# Patient Record
Sex: FEMALE | Race: WHITE
[De-identification: names, ages, dates, MRNs, and addresses within clinical notes are randomized per-mention and may not be internally consistent; named-entity substitution may affect disease eponyms.]

---

## 2018-06-15 ENCOUNTER — HOSPITAL ENCOUNTER (EMERGENCY)
Dept: HOSPITAL 62 - ER | Age: 73
Discharge: HOME | End: 2018-06-15
Payer: MEDICARE

## 2018-06-15 VITALS — DIASTOLIC BLOOD PRESSURE: 78 MMHG | SYSTOLIC BLOOD PRESSURE: 173 MMHG
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
## 2018-06-15 DIAGNOSIS — F17.200: ICD-10-CM

## 2018-06-15 DIAGNOSIS — L03.119: Primary | ICD-10-CM

## 2018-06-15 DIAGNOSIS — R21: ICD-10-CM

## 2018-06-15 LAB
ADD MANUAL DIFF: NO
ANION GAP SERPL CALC-SCNC: 10 MMOL/L (ref 5–19)
BASOPHILS # BLD AUTO: 0 10^3/UL (ref 0–0.2)
BASOPHILS NFR BLD AUTO: 0.6 % (ref 0–2)
BUN SERPL-MCNC: 34 MG/DL (ref 7–20)
CALCIUM: 10 MG/DL (ref 8.4–10.2)
CHLORIDE SERPL-SCNC: 107 MMOL/L (ref 98–107)
CO2 SERPL-SCNC: 28 MMOL/L (ref 22–30)
EOSINOPHIL # BLD AUTO: 0.5 10^3/UL (ref 0–0.6)
EOSINOPHIL NFR BLD AUTO: 7.6 % (ref 0–6)
ERYTHROCYTE [DISTWIDTH] IN BLOOD BY AUTOMATED COUNT: 13.7 % (ref 11.5–14)
GLUCOSE SERPL-MCNC: 90 MG/DL (ref 75–110)
HCT VFR BLD CALC: 43.1 % (ref 36–47)
HGB BLD-MCNC: 14.6 G/DL (ref 12–15.5)
LYMPHOCYTES # BLD AUTO: 2.1 10^3/UL (ref 0.5–4.7)
LYMPHOCYTES NFR BLD AUTO: 31.6 % (ref 13–45)
MCH RBC QN AUTO: 31.9 PG (ref 27–33.4)
MCHC RBC AUTO-ENTMCNC: 33.9 G/DL (ref 32–36)
MCV RBC AUTO: 94 FL (ref 80–97)
MONOCYTES # BLD AUTO: 0.4 10^3/UL (ref 0.1–1.4)
MONOCYTES NFR BLD AUTO: 6.6 % (ref 3–13)
NEUTROPHILS # BLD AUTO: 3.6 10^3/UL (ref 1.7–8.2)
NEUTS SEG NFR BLD AUTO: 53.6 % (ref 42–78)
PLATELET # BLD: 138 10^3/UL (ref 150–450)
POTASSIUM SERPL-SCNC: 3.8 MMOL/L (ref 3.6–5)
RBC # BLD AUTO: 4.58 10^6/UL (ref 3.72–5.28)
SODIUM SERPL-SCNC: 144.9 MMOL/L (ref 137–145)
TOTAL CELLS COUNTED % (AUTO): 100 %
WBC # BLD AUTO: 6.7 10^3/UL (ref 4–10.5)

## 2018-06-15 PROCEDURE — 36415 COLL VENOUS BLD VENIPUNCTURE: CPT

## 2018-06-15 PROCEDURE — 87040 BLOOD CULTURE FOR BACTERIA: CPT

## 2018-06-15 PROCEDURE — 80048 BASIC METABOLIC PNL TOTAL CA: CPT

## 2018-06-15 PROCEDURE — 99283 EMERGENCY DEPT VISIT LOW MDM: CPT

## 2018-06-15 PROCEDURE — 96365 THER/PROPH/DIAG IV INF INIT: CPT

## 2018-06-15 PROCEDURE — 85025 COMPLETE CBC W/AUTO DIFF WBC: CPT

## 2018-06-15 NOTE — ER DOCUMENT REPORT
ED Skin Rash/Insect Bite/Abscs





- General


Chief Complaint: Insect Bite


Stated Complaint: POSSIBLE INSECT BITE


Time Seen by Provider: 06/15/18 16:09


Notes: 


Patient is a 73-year-old female who presents with diffuse erythematous rash on 

bilateral lower extremities and her left elbow.  She went to urgent care 

yesterday and was started on Keflex and Bactrim after she was bitten by 

multiple spiders that she saw and killed.  The rash is pruritic and she has 

taken Benadryl twice to help with the itchiness.  Patient denies fevers, 

numbness, tingling, difficulty breathing or sores in her mouth.


TRAVEL OUTSIDE OF THE [place] IN LAST 30 DAYS: No





- Related Data


Allergies/Adverse Reactions: 


 





Penicillins Allergy (Verified 06/15/18 15:02)


 


Quinolones Allergy (Verified 06/15/18 15:02)


 











Past Medical History





- General


Information source: Patient





- Social History


Smoking Status: Current Every Day Smoker


Frequency of alcohol use: None


Drug Abuse: None


Family History: Reviewed & Not Pertinent


Patient has suicidal ideation: No


Patient has homicidal ideation: No


Renal/ Medical History: Denies: Hx Peritoneal Dialysis





Review of Systems





- Review of Systems


Notes: 


REVIEW OF SYSTEMS:


CONSTITUTIONAL: -fevers, -chills


EENT: -eye pain, -difficulty swallowing, -nasal congestion


CARDIOVASCULAR: -chest pain, -syncope.


RESPIRATORY: -cough, -SOB


GASTROINTESTINAL: -abdominal pain, -nausea, -vomiting, -diarrhea


GENITOURINARY: -dysuria, -hematuria


MUSCULOSKELETAL: -back pain, -neck pain


SKIN: +rash


HEMATOLOGIC: -easy bruising or bleeding.


LYMPHATIC: -swollen, enlarged glands.


NEUROLOGICAL: -altered mental status or loss of consciousness, -headache, -

neurologic symptoms


PSYCHIATRIC: -anxiety, -depression.


ALL OTHER SYSTEMS REVIEWED AND NEGATIVE.





Physical Exam





- Vital signs


Vitals: 


 











Temp Pulse Resp BP Pulse Ox


 


 97.8 F   65   18   102/68   90 L


 


 06/15/18 15:06  06/15/18 15:06  06/15/18 15:06  06/15/18 15:06  06/15/18 15:06














- Notes


Notes: 


PHYSICAL EXAMINATION:


GENERAL: Well-appearing, well-nourished and in no acute distress.


HEAD: Atraumatic, normocephalic.


EYES: Pupils equal round and reactive to light, extraocular movements intact, 

sclera anicteric, conjunctiva are normal.


ENT: nares patent, oropharynx clear without exudates.  Moist mucous membranes.


NECK: Normal range of motion, supple without lymphadenopathy


LUNGS: Breath sounds clear to auscultation bilaterally and equal.  No wheezes 

rales or rhonchi.


HEART: Regular rate and rhythm without murmurs


ABDOMEN: Soft, nontender, normoactive bowel sounds.  No guarding, no rebound.  

No masses appreciated.


EXTREMITIES: Normal range of motion, no pitting or edema.  No cyanosis.


NEUROLOGICAL: Cranial nerves grossly intact.  Normal speech, normal gait.  

Normal sensory and motor exams.


PSYCH: Normal mood, normal affect.


SKIN: Multiple discrete erythematous areas over right lower extremity, small 

blister over right medial ankle.





Course





- Re-evaluation


Re-evalutation: 


Pt with redness of her right lower leg after multiple spider bites that she 

saw.  Some do appear to be worsening, despite Bactrim and Keflex.  Will switch 

her to clindamycin.  The wounds were demarcated and she understands strict 

return precautions.  She is afebrile and has a normal WBC.  No signs of 

necrotizing fasciitis or abscess at this time.  Given very strict return 

precautions and she understands.





- Vital Signs


Vital signs: 


 











Temp Pulse Resp BP Pulse Ox


 


 97.9 F   68   16   173/78 H  92 


 


 06/15/18 20:09  06/15/18 20:09  06/15/18 20:09  06/15/18 20:09  06/15/18 20:09














- Laboratory


Result Diagrams: 


 06/15/18 16:45





 06/15/18 16:45


Laboratory results interpreted by me: 


 











  06/15/18 06/15/18





  16:45 16:45


 


Plt Count   138 L


 


Eosinophils %   7.6 H


 


BUN  34 H 














Discharge





- Discharge


Clinical Impression: 


 Rash





Cellulitis


Qualifiers:


 Site of cellulitis: extremity Site of cellulitis of extremity: lower extremity 

Laterality: unspecified laterality Qualified Code(s): L03.119 - Cellulitis of 

unspecified part of limb





Insect bite


Qualifiers:


 Encounter type: initial encounter Qualified Code(s): W57.XXXA - Bitten or 

stung by nonvenomous insect and other nonvenomous arthropods, initial encounter





Condition: Stable


Disposition: HOME, SELF-CARE


Additional Instructions: 


CELLULITIS:





     You have an infection of your skin and underlying soft tissues called 

cellulitis.  This is due to bacteria, which can enter through any break in the 

skin, or even through an irritated hair follicle.  Untreated, cellulitis will 

usually worsen.


     Antibiotics are required.  Usually, warm packs or warm soaks, and 

elevation of the infected area are recommended.  You should start getting 

better within 24 to 36 hours.


     Most infections respond quickly to the right medication. Follow-up care is 

important, however, to check for abscess (boil) formation, unsuspected foreign 

body, or resistant infection.


     If you develop fever, chills, or if the area of infection is becoming 

rapidly more swollen or painful, call the doctor at once.





ANTIBIOTIC THERAPY:


     You have been given an antibiotic prescription.  It's important that you 

take all the medication, unless instructed otherwise by your physician.  

Failure to complete the entire course can result in relapse of your condition.


     Common side effects of antibiotics include nausea, intestinal cramping, or 

diarrhea.  Women may develop vaginal yeast infections, and babies can get yeast 

(thrush) in the mouth following the use of antibiotics.  Contact your physician 

if you develop significant side effects from this medication.


     Allergy to this antibiotic can result in hives, wheezing, faintness, or 

itching.  If symptoms of allergy occur, stop the medication and call the doctor.





CLINDAMYCIN:


     You have been given a prescription for the antibiotic clindamycin.  It is 

often prescribed for infections in the mouth, such as dental infections or 

abscesses, and for skin infections due to MRSA.  It's important that you take 

all the medication, unless instructed otherwise by your physician.  Failure to 

complete the entire course can result in relapse of your condition.


     Common side effects of antibiotics include nausea, intestinal cramping, or 

diarrhea.  Women may develop vaginal yeast infections, and babies can get yeast 

(thrush) in the mouth following the use of antibiotics.  Contact your physician 

if you develop significant side effects from this medication.


     Allergy to this antibiotic can result in hives, wheezing, faintness, or 

itching.  If symptoms of allergy occur, stop the medication and call the doctor.





FOLLOW-UP CARE:


If you have been referred to a physician for follow-up care, call the physician

s office for an appointment as you were instructed or within the next two days.

  If you experience worsening or a significant change in your symptoms, notify 

the physician immediately or return to the Emergency Department at any time for 

re-evaluation.


Prescriptions: 


Clindamycin HCl 300 mg PO Q8H 7 Days  capsule


Referrals: 


TRANG AHMADI NP-C [Primary Care Provider] - Follow up as needed

## 2018-07-09 ENCOUNTER — HOSPITAL ENCOUNTER (OUTPATIENT)
Dept: HOSPITAL 62 - SP | Age: 73
End: 2018-07-09
Attending: SURGERY
Payer: MEDICARE

## 2018-07-09 DIAGNOSIS — R09.89: Primary | ICD-10-CM

## 2018-07-09 DIAGNOSIS — L98.499: ICD-10-CM

## 2018-07-09 PROCEDURE — 93925 LOWER EXTREMITY STUDY: CPT

## 2018-07-09 NOTE — XCELERA REPORT
[address] [address]

                             Tel: [phone]

                             Fax: [phone]



                    Lower Extremity Arterial Evaluation

____________________________________________________________________________



Name: PHILLY WATERS

MRN: [id number]                Age: 73 yrs

Gender: Female                 : 1945

Patient Status: Outpatient     Patient Location: 

Account #: [id number]

Study Date: 2018 10:33 AM

Accession #: [id number]

____________________________________________________________________________



Procedure: A color flow and duplex scan of the lower extremity arteries was

performed bilaterally with velocity and waveform anaylsis. Ankle brachial

indicies performed.

Reason For Study: ULCER, ABSENT PEDAL PULSES





Ordering Physician: WILLIAMS, LENNOX

Performed By: Kameron Pike

____________________________________________________________________________



____________________________________________________________________________





Measurements and Calculations



                                   Right         Left

  CFA PSV                          188.6        212.5    cm/sec

  Prox PFA PSV                     -121.8       -157.1   cm/sec

  Prox SFA PSV                     135.1        171.9    cm/sec

  Mid SFA PSV                      -126.5       -115.7   cm/sec

  Dist SFA PSV                     -76.6        -91.1    cm/sec

  Prox Pop A PSV                    82.0        101.2    cm/sec

  Dist KEON PSV                      59.7         70.3    cm/sec

  Dist PTA PSV                      71.6         77.8    cm/sec

  Kaveh Pedis PSV                     60.1         17.5    cm/sec



____________________________________________________________________________



Right Side Arterial Evaluation

Normal velocity and triphasic waveforms noted from the Common Femoral

artery to the infrageniculate vessels.



0 % stenosis.



Ankle Brachial index is 1.07.



Left Side Arterial Evaluation

Normal velocity and triphasic waveforms noted from the Common Femoral

artery to the Posterior Tibial . Biphasic with maintained velocity in the

Anterior Tibial artery.



0-15 % stenosis in the Anterior Tibial artery.



Ankle Brachial index is 1.06.

____________________________________________________________________________



Interpretation Summary

No hemodynamically significant lesions in the right lower extremity only,

on duplex imaging, at rest. Mild hemodynamically significant lesions in the

left lower extremity only, on duplex imaging, at rest.

____________________________________________________________________________



Electronically signed by:      Lennox Williams      on 2018 02:54 PM



CC: WILLIAMS, LENNOX

>

Williams, Lennox

## 2018-09-22 ENCOUNTER — HOSPITAL ENCOUNTER (EMERGENCY)
Dept: HOSPITAL 62 - ER | Age: 73
Discharge: HOME | End: 2018-09-22
Payer: MEDICARE

## 2018-09-22 VITALS — SYSTOLIC BLOOD PRESSURE: 190 MMHG | DIASTOLIC BLOOD PRESSURE: 102 MMHG

## 2018-09-22 DIAGNOSIS — F17.210: ICD-10-CM

## 2018-09-22 DIAGNOSIS — M79.89: ICD-10-CM

## 2018-09-22 DIAGNOSIS — I10: ICD-10-CM

## 2018-09-22 DIAGNOSIS — J40: ICD-10-CM

## 2018-09-22 DIAGNOSIS — I87.2: Primary | ICD-10-CM

## 2018-09-22 LAB
ADD MANUAL DIFF: NO
ALBUMIN SERPL-MCNC: 3.2 G/DL (ref 3.5–5)
ALP SERPL-CCNC: 97 U/L (ref 38–126)
ALT SERPL-CCNC: 26 U/L (ref 9–52)
ANION GAP SERPL CALC-SCNC: 6 MMOL/L (ref 5–19)
APPEARANCE UR: CLEAR
APTT BLD: 30.9 SEC (ref 23.5–35.8)
APTT PPP: YELLOW S
AST SERPL-CCNC: 25 U/L (ref 14–36)
BASOPHILS # BLD AUTO: 0 10^3/UL (ref 0–0.2)
BASOPHILS NFR BLD AUTO: 0.8 % (ref 0–2)
BILIRUB DIRECT SERPL-MCNC: 0.5 MG/DL (ref 0–0.4)
BILIRUB SERPL-MCNC: 0.6 MG/DL (ref 0.2–1.3)
BILIRUB UR QL STRIP: NEGATIVE
BUN SERPL-MCNC: 18 MG/DL (ref 7–20)
CALCIUM: 9.4 MG/DL (ref 8.4–10.2)
CHLORIDE SERPL-SCNC: 104 MMOL/L (ref 98–107)
CK MB SERPL-MCNC: 2.47 NG/ML (ref ?–4.55)
CK SERPL-CCNC: 32 U/L (ref 30–135)
CO2 SERPL-SCNC: 30 MMOL/L (ref 22–30)
EOSINOPHIL # BLD AUTO: 0.3 10^3/UL (ref 0–0.6)
EOSINOPHIL NFR BLD AUTO: 5.3 % (ref 0–6)
ERYTHROCYTE [DISTWIDTH] IN BLOOD BY AUTOMATED COUNT: 12.8 % (ref 11.5–14)
GLUCOSE SERPL-MCNC: 103 MG/DL (ref 75–110)
GLUCOSE UR STRIP-MCNC: NEGATIVE MG/DL
HCT VFR BLD CALC: 39.7 % (ref 36–47)
HGB BLD-MCNC: 13.8 G/DL (ref 12–15.5)
INR PPP: 0.94
KETONES UR STRIP-MCNC: NEGATIVE MG/DL
LYMPHOCYTES # BLD AUTO: 1.5 10^3/UL (ref 0.5–4.7)
LYMPHOCYTES NFR BLD AUTO: 29.4 % (ref 13–45)
MCH RBC QN AUTO: 33.3 PG (ref 27–33.4)
MCHC RBC AUTO-ENTMCNC: 34.6 G/DL (ref 32–36)
MCV RBC AUTO: 96 FL (ref 80–97)
MONOCYTES # BLD AUTO: 0.5 10^3/UL (ref 0.1–1.4)
MONOCYTES NFR BLD AUTO: 9.1 % (ref 3–13)
NEUTROPHILS # BLD AUTO: 2.8 10^3/UL (ref 1.7–8.2)
NEUTS SEG NFR BLD AUTO: 55.4 % (ref 42–78)
NITRITE UR QL STRIP: NEGATIVE
NT PRO BNP: 710 PG/ML (ref 5–900)
PH UR STRIP: 6 [PH] (ref 5–9)
PLATELET # BLD: 142 10^3/UL (ref 150–450)
POTASSIUM SERPL-SCNC: 4.2 MMOL/L (ref 3.6–5)
PROT SERPL-MCNC: 6.5 G/DL (ref 6.3–8.2)
PROT UR STRIP-MCNC: NEGATIVE MG/DL
PROTHROMBIN TIME: 13.1 SEC (ref 11.4–15.4)
RBC # BLD AUTO: 4.13 10^6/UL (ref 3.72–5.28)
SODIUM SERPL-SCNC: 139.5 MMOL/L (ref 137–145)
SP GR UR STRIP: 1.01
TOTAL CELLS COUNTED % (AUTO): 100 %
TROPONIN I SERPL-MCNC: 0.01 NG/ML
UROBILINOGEN UR-MCNC: 4 MG/DL (ref ?–2)
WBC # BLD AUTO: 5.1 10^3/UL (ref 4–10.5)

## 2018-09-22 PROCEDURE — 99284 EMERGENCY DEPT VISIT MOD MDM: CPT

## 2018-09-22 PROCEDURE — 85730 THROMBOPLASTIN TIME PARTIAL: CPT

## 2018-09-22 PROCEDURE — 93005 ELECTROCARDIOGRAM TRACING: CPT

## 2018-09-22 PROCEDURE — 93010 ELECTROCARDIOGRAM REPORT: CPT

## 2018-09-22 PROCEDURE — 99406 BEHAV CHNG SMOKING 3-10 MIN: CPT

## 2018-09-22 PROCEDURE — 82553 CREATINE MB FRACTION: CPT

## 2018-09-22 PROCEDURE — 82550 ASSAY OF CK (CPK): CPT

## 2018-09-22 PROCEDURE — 71046 X-RAY EXAM CHEST 2 VIEWS: CPT

## 2018-09-22 PROCEDURE — 36415 COLL VENOUS BLD VENIPUNCTURE: CPT

## 2018-09-22 PROCEDURE — 80053 COMPREHEN METABOLIC PANEL: CPT

## 2018-09-22 PROCEDURE — 83880 ASSAY OF NATRIURETIC PEPTIDE: CPT

## 2018-09-22 PROCEDURE — 81001 URINALYSIS AUTO W/SCOPE: CPT

## 2018-09-22 PROCEDURE — 93971 EXTREMITY STUDY: CPT

## 2018-09-22 PROCEDURE — 85025 COMPLETE CBC W/AUTO DIFF WBC: CPT

## 2018-09-22 PROCEDURE — 85610 PROTHROMBIN TIME: CPT

## 2018-09-22 PROCEDURE — 84484 ASSAY OF TROPONIN QUANT: CPT

## 2018-09-22 NOTE — ER DOCUMENT REPORT
ED Medical Screen (RME)





- General


Chief Complaint: Shortness Of Breath


Stated Complaint: LEGS SWELLING


Time Seen by Provider: 09/22/18 14:53


Notes: 





73-year-old female to the emergency department chief complaint of left leg 

swelling, left leg pain.  Also complaining of not feeling well.  Short of breath

, cough, wheeze.  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  History of aneurysm in 

the brain.  Does smoke.  Had knee surgery done approximately a year ago and has 

had intermittent swelling in the leg since that time.  Generally having some 

chills and aches as well.





I have greeted and performed a rapid initial assessment of this patient.  A 

comprehensive ED assessment and evaluation of the patient, analysis of test 

results and completion of the medical decision making process will be conducted 

by additional ED providers.


TRAVEL OUTSIDE OF THE [place] IN LAST 30 DAYS: No





- HPI


Onset: Last week


Onset/Duration: Gradual, Worse





- Related Data


Allergies/Adverse Reactions: 


 





Penicillins Allergy (Verified 09/22/18 14:24)


 


Quinolones Allergy (Verified 09/22/18 14:24)


 











Past Medical History





- Social History


Chew tobacco use (# tins/day): No


Frequency of alcohol use: None


Drug Abuse: None


Renal/ Medical History: Reports: Hx Peritoneal Dialysis





Physical Exam





- Vital signs


Vitals: 





 











Temp Pulse Resp BP Pulse Ox


 


 98.3 F   70   18   133/66 H  93 


 


 09/22/18 14:42  09/22/18 14:42  09/22/18 14:42  09/22/18 14:42  09/22/18 14:42














Course





- Vital Signs


Vital signs: 





 











Temp Pulse Resp BP Pulse Ox


 


 98.3 F   70   18   133/66 H  93 


 


 09/22/18 14:42  09/22/18 14:42  09/22/18 14:42  09/22/18 14:42  09/22/18 14:42














Doctor's Discharge





- Discharge


Referrals: 


WILLIAMS,LENNOX, MD [Primary Care Provider] - Follow up as needed

## 2018-09-22 NOTE — ER DOCUMENT REPORT
ED General





- General


Chief Complaint: Leg Swelling


Stated Complaint: LEGS SWELLING


Time Seen by Provider: 09/22/18 14:53


Notes: 





Patient is a 73 year old female with a past medical history of hypertension who 

presents with swelling to her left leg that has become more pronounced in the 

past 1 week.  The patient relays a history of long-standing swelling to the 

left lower extremity ever since she had a knee replacement on the left side.  

She states however the past week it has been more prominent than normal.  She 

notes an associated dull, throbbing, constant pain to the area of the leg below 

the knee.  Nothing seems to improve or worsening swelling or pain.  She does 

state that when she places a compression stocking the swelling goes down but 

states "it comes right back".  She has not seen her general doctor regarding 

today's concerns.  No recent medication changes.  Contrary to triage assessment 

the patient to be denies shortness of breath, states it is much more about her 

left leg swelling that prompted her to the emergency department.


TRAVEL OUTSIDE OF THE [place] IN LAST 30 DAYS: No





- Related Data


Allergies/Adverse Reactions: 


 





Penicillins Allergy (Verified 09/22/18 14:24)


 


Quinolones Allergy (Verified 09/22/18 14:24)


 











Past Medical History





- General


Information source: Patient





- Social History


Smoking Status: Current Every Day Smoker


Cigarette use (# per day): Yes - Half pack per day


Chew tobacco use (# tins/day): No


Smoking Education Provided: Yes - Smoking cessation counseling was provided for 

4 minutes at the bedside 


Frequency of alcohol use: None


Drug Abuse: None


Lives with: Family


Family History: Reviewed & Not Pertinent


Patient has suicidal ideation: No


Patient has homicidal ideation: No


Renal/ Medical History: Reports: Hx Peritoneal Dialysis





Review of Systems





- Review of Systems


Notes: 





Constitutional: Negative for fever.


HENT: Positive for sore throat.


Eyes: Negative for visual changes.


Cardiovascular: Negative for chest pain.


Respiratory: Positive for cough


Gastrointestinal: Negative for abdominal pain, vomiting or diarrhea.


Genitourinary: Negative for dysuria.


Musculoskeletal: Positive for left lower extremity swelling


Skin: Negative for rash.


Neurological: Negative for headaches, weakness or numbness.





10 point ROS negative except as marked above and in HPI.





Physical Exam





- Vital signs


Vitals: 


 











Temp Pulse Resp BP Pulse Ox


 


 98.3 F   70   18   133/66 H  93 


 


 09/22/18 14:42  09/22/18 14:42  09/22/18 14:42  09/22/18 14:42  09/22/18 14:42











Interpretation: Normal


Notes: 





PHYSICAL EXAMINATION:





GENERAL: Well-appearing, well-nourished and in no acute distress.





HEAD: Atraumatic, normocephalic.





EYES: Pupils equal round and reactive to light, extraocular movements intact, 

sclera anicteric, conjunctiva are normal.





ENT: nares patent, oropharynx clear without exudates.  Moist mucous membranes.  

Nasal congestion.





NECK: Normal range of motion, supple without lymphadenopathy





LUNGS: Breath sounds clear to auscultation bilaterally and equal.  No wheezes 

rales or rhonchi.





HEART: Regular rate and rhythm without murmurs





ABDOMEN: Soft, nontender, normoactive bowel sounds.  No guarding, no rebound.  

No masses appreciated.





EXTREMITIES: Normal range of motion, 3+ pitting edema in the left lower 

externally, 1+ in the right





NEUROLOGICAL: No focal neurological deficits. Moves all extremities 

spontaneously and on command.





PSYCH: Normal mood, normal affect.





SKIN: Warm, Dry, normal turgor, no rashes or lesions noted.





Course





- Re-evaluation


Re-evalutation: 





09/22/18 19:09


Patient presents with 1 week of worsening left lower extremity swelling 

although notes that the swelling is chronic.  She states that the swelling 

became a persistent problem after she had knee surgery and stripping of several 

veins to the left lower extremity.  Her findings and exam are most consistent 

with chronic stasis dermatitis with associated chronic venous insufficiency.  I 

have instructed her to continue to wear compression stocking to the side.  A 

venous Doppler ultrasound obtained in triage has been informally read by the 

tech as not demonstrating a DVT.  I do not clinically suspect this either given 

the chronic nature of her symptoms as well as the physical examination.  The 

patient has also complained of nasal congestion, cough, runny eyes and feeling 

like she has come down with a cold.  Her chest x-ray is normal without any 

evidence of an acute infiltrate.  She is not hypoxic nor tachypneic.  Labs 

unremarkable without evidence of an elevated BNP and again chest x-ray does not 

show any vascular congestion or pulmonary edema.  Clinical history is not 

consistent with an acute pulmonary embolus or ACS.  Suspect most likely a 

bronchitis.  Patient does also have quite elevated blood pressure.  She was 

apparently taken off of some of her blood pressure medication several months 

ago due to concerns that they were lowering her blood pressure too far.  Given 

the degree of the blood pressure elevation, the patient has been started on 

hydrochlorothiazide.  At this time will discharge with return precautions and 

follow-up recommendations.  Verbal discharge instructions given a the bedside 

and opportunity for questions given. Medication warnings reviewed. Patient is 

in agreement with this plan and has verbalized understanding of return 

precautions and the need for primary care follow-up in the next 24-72 hours.





- Vital Signs


Vital signs: 


 











Temp Pulse Resp BP Pulse Ox


 


 98.7 F   70   19   190/102 H  93 


 


 09/22/18 19:30  09/22/18 14:42  09/22/18 19:30  09/22/18 19:30  09/22/18 19:30














- Laboratory


Result Diagrams: 


 09/22/18 16:23





 09/22/18 16:23


Laboratory results interpreted by me: 


 











  09/22/18 09/22/18 09/22/18





  16:23 16:23 18:17


 


Plt Count  142 L  


 


Direct Bilirubin   0.5 H 


 


Albumin   3.2 L 


 


Urine Urobilinogen    4.0 H














- Diagnostic Test


Radiology reviewed: Image reviewed, Reports reviewed


Radiology results interpreted by me: 





09/22/18 19:08


CXR: No acute infiltrate or edema.





- EKG Interpretation by Me


Additional EKG results interpreted by me: 





09/22/18 19:08


NSR. Rate 70. No ST elevations or depressions. Qtc 409.





Discharge





- Discharge


Clinical Impression: 


 Left leg swelling, Chronic venous insufficiency, Essential hypertension, 

Bronchitis





Condition: Good


Disposition: HOME, SELF-CARE


Additional Instructions: 


Please wear compression stockings on your left leg throughout the day to help 

reduce the swelling.  The swelling in her leg is due to chronic venous 

insufficiency likely related to your prior orthopedic surgeries.  Your cough 

and congestion is most consistent with bronchitis. This can take up to 12 weeks 

to fully resolve. This is generally due to a viral infection. Please follow-up 

with your primary doctor in the next 2-3 days. Return if you develop worsening 

cough, vomiting, fever >100.4, pass out, begin coughing blood, or have any 

other symptoms that are concerning to you.  You have also been started on 

hydrochlorothiazide to help control your blood pressure.  Please take as 

directed.


Prescriptions: 


Hydrochlorothiazide 25 mg PO DAILY #30 tablet


Referrals: 


WILLIAMS,LENNOX, MD [Primary Care Provider] - Follow up as needed

## 2018-09-22 NOTE — RADIOLOGY REPORT (SQ)
EXAM DESCRIPTION:  CHEST 2 VIEWS



COMPLETED DATE/TIME:  9/22/2018 3:42 pm



REASON FOR STUDY:  sob



COMPARISON:  None.



EXAM PARAMETERS:  NUMBER OF VIEWS: two views

TECHNIQUE: Digital Frontal and Lateral radiographic views of the chest acquired.

RADIATION DOSE: NA

LIMITATIONS: none



FINDINGS:  LUNGS AND PLEURA: No opacities, masses or pneumothorax. No pleural effusion.

MEDIASTINUM AND HILAR STRUCTURES: No masses or contour abnormalities.

HEART AND VASCULAR STRUCTURES: Heart normal size.  No evidence for failure.

BONES: 50% chronic appearing compression deformity at T12

HARDWARE: There is an abdominal aortic stent graft in the bottom edge of the field of view

OTHER: No other significant finding.



IMPRESSION:  NO ACUTE RADIOGRAPHIC FINDING IN THE CHEST.



TECHNICAL DOCUMENTATION:  JOB ID:  [id number]

 2011 [employer]- All Rights Reserved



Reading location - IP/workstation name: [email or web address]

## 2018-09-22 NOTE — EKG REPORT
SEVERITY:- ABNORMAL ECG -

SINUS RHYTHM

FIRST DEGREE AV BLOCK

:

Confirmed by: Gloria Victoria MD 22-Sep-2018 20:06:26

## 2018-09-23 NOTE — RADIOLOGY REPORT (SQ)
EXAM DESCRIPTION:  VENOUS UNILATERAL LOWER



COMPLETED DATE/TIME:  9/22/2018 9:13 pm



REASON FOR STUDY:  pain left leg with swelling



COMPARISON:  None.



TECHNIQUE:  Dynamic and static gray scale and color images acquired of the left leg venous system. Se
lected spectral images acquired with additional compression and augmentation maneuvers. The contralat
eral common femoral vein and saphenofemoral junction were also imaged. Images stored on PACS.



LIMITATIONS:  None.



FINDINGS:  COMMON FEMORAL: Normal phasicity, compression and augmentation. No visualized echogenic ma
terial on gray scale. No defects on color images.

FEMORAL: Normal compression and augmentation. No visualized echogenic material on gray scale. No defe
cts on color images.

POPLITEAL: Normal compression, augmentation. No visualized echogenic material on gray scale. No defec
ts on color images.

CALF VESSELS: Normal compression, augmentation. No visualized echogenic material on gray scale. No de
fects on color images.

GSV and SSV: Normal compression, augmentation. No visualized echogenic material on gray scale. No def
ects on color images.

ANY DEEP VENOUS INSUFFICIENCY: Not evaluated.

ANY EVIDENCE OF POPLITEAL CYST: No.

OTHER: No other significant finding.

CONTRALATERAL COMMON FEMORAL VEIN AND SAPHENOFEMORAL JUNCTION:

Normal phasicity, compression and augmentation. No visualized echogenic material on gray scale. No de
fects on color images.



IMPRESSION:  NO EVIDENCE DVT OR SVT IN THE LEFT LEG.



TECHNICAL DOCUMENTATION:  JOB ID:  [id number]

 2011 [employer]- All Rights Reserved



Reading location - IP/workstation name: [email or web address]